# Patient Record
Sex: FEMALE
[De-identification: names, ages, dates, MRNs, and addresses within clinical notes are randomized per-mention and may not be internally consistent; named-entity substitution may affect disease eponyms.]

---

## 2020-07-16 PROBLEM — Z00.00 ENCOUNTER FOR PREVENTIVE HEALTH EXAMINATION: Status: ACTIVE | Noted: 2020-07-16

## 2020-07-20 DIAGNOSIS — Z86.39 PERSONAL HISTORY OF OTHER ENDOCRINE, NUTRITIONAL AND METABOLIC DISEASE: ICD-10-CM

## 2020-07-20 DIAGNOSIS — Z82.49 FAMILY HISTORY OF ISCHEMIC HEART DISEASE AND OTHER DISEASES OF THE CIRCULATORY SYSTEM: ICD-10-CM

## 2020-07-20 DIAGNOSIS — Z72.89 OTHER PROBLEMS RELATED TO LIFESTYLE: ICD-10-CM

## 2020-07-20 DIAGNOSIS — I83.93 ASYMPTOMATIC VARICOSE VEINS OF BILATERAL LOWER EXTREMITIES: ICD-10-CM

## 2020-07-21 ENCOUNTER — APPOINTMENT (OUTPATIENT)
Dept: VASCULAR SURGERY | Facility: CLINIC | Age: 35
End: 2020-07-21
Payer: COMMERCIAL

## 2020-07-21 VITALS
SYSTOLIC BLOOD PRESSURE: 128 MMHG | HEART RATE: 96 BPM | WEIGHT: 173 LBS | DIASTOLIC BLOOD PRESSURE: 88 MMHG | HEIGHT: 68 IN | OXYGEN SATURATION: 98 % | TEMPERATURE: 97.1 F | BODY MASS INDEX: 26.22 KG/M2

## 2020-07-21 PROCEDURE — 99203 OFFICE O/P NEW LOW 30 MIN: CPT | Mod: 25

## 2020-07-21 PROCEDURE — 93970 EXTREMITY STUDY: CPT

## 2020-07-21 NOTE — REASON FOR VISIT
[Initial Evaluation] : an initial evaluation [FreeTextEntry1] : pt here for b/l leg evaluation for severe b/l CVI with large branch varicose veins, dilated throbbing tender torturous; sx of swelling heat heaviness fatigue cramps edema tenderness itching and progressively worsening sx that interfere with life style. No sig PMH or PSH other than left knee ACL and meniscus surgery. Has worn support hose in the past with no relief. B/L vle today shows b/l gsv severe reflux and left aasv reflux. Pt will need b/l gsv evlt and b/l stab phlebectomy. Possible left aasv evlt.

## 2020-07-21 NOTE — DATA REVIEWED
[FreeTextEntry1] : \par RVT Penalo performed b/l VLE:\par \par Today’s bilateral leg ultrasound results confirm no DVT and no SVT bilateral leg.\par + deep reflux right femoral \par + severe superficial reflux  right and left  gsv,  left AASV\par  \par \par

## 2020-07-21 NOTE — PHYSICAL EXAM
[2+] : right 2+ [Ankle Swelling (On Exam)] : present [Ankle Swelling On The Left] : moderate [Varicose Veins Of Lower Extremities] : bilaterally [Ankle Swelling Bilaterally] : severe [] : present [Ankle Swelling On The Right] : mild [No Rash or Lesion] : No rash or lesion [Oriented to Person] : oriented to person [Alert] : alert [Oriented to Place] : oriented to place [Oriented to Time] : oriented to time [Calm] : calm [JVD] : no jugular venous distention  [Purpura] : no purpura  [Petechiae] : no petechiae [Skin Ulcer] : no ulcer [Skin Induration] : no induration [de-identified] : wdwn nad [de-identified] : wnl [de-identified] : aminatal [FreeTextEntry1] : large b/l leg branch varicose veins especially rt calf and left thigh - pictures taken [de-identified] : wnl [de-identified] : wnl

## 2020-07-21 NOTE — PROCEDURE
[FreeTextEntry1] : Varicose vein of bilateral lower extremity with inflammation (I83.11,I83.12);  Varicose veins with complication (I83.893); Venous/reflux insufficiency(I87.2); Leg pain (right, left) (M79.604, M79.605); Swelling of limb (M79.89); Cramps of limb (R25.2); Chronic venous hypertension with other complication (I87.399)\par \par Endovenous Laser Ablation (EVLT)(44871)\par \par Ms. CRISTINO LAUGHLIN with persistent and worsening severe bilateral leg Stage [] venous insufficiency which is (responsive/unresponsive)[] to a trial of support stockings 20-30 mmHg, elevation, exercise and pain medication.  Patient has complaints of worsening right leg CVI symptoms of pain with swelling, fatigue, heaviness, throbbing, edema, cramping, restlessness, and painful varicosities.  The patient’s pain interferes with their ability to exercise and work.  \par Patient has worn 20-30 mmHg support hose daily with (definite, no definite)[]  improvement in reflux symptoms.  This indicates that the patient will benefit from definitive therapy with endovenous laser ablation.  Patient has intact pulses in both legs with no evidence of arterial insufficiency.  \par Treatment is indicated not for cosmetic reasons but for symptomatic venous reflux disease with symptoms which are (responsive/not responsive)[]  to conservative therapy with high-grade medical support hose.  \par On venous ultrasound Duplex study, there is clear evidence of severe bilateral leg [] vein reflux with branch varicosities.  The size for the (GSV, LSV)[]  vein that needs EVLT is enlarged and measures up to [] cm .   Duration of reflux is measured at > []  seconds in the legs.  Arterial exam of the bilateral legs reveals intact pulses throughout.  \par The need for definitive effective treatment is based solely on severe, interfering and worsening reflux symptoms with evidence of severe bilateral (GSV, LSV)[] reflux with branch varicosities on ultrasound, (responsive/unresponsive)[] to high-pressure conservative stocking treatment.  \par \par Discussed with the patient that in laser vein treatment, a thin laser fiber is inserted into the vein through a very small entry point in the leg, and the laser light that emits through this fiber will seal the faulty vein.  This part of the procedure takes just a few minutes and requires only a local anesthetic.   You may feel an unfamiliar sensation, it is not painful.  You are encouraged to walk immediately after the procedure, and you can resume normal activity the same day.  No heavy lifting or strenuous activities x 7 days.  After treatment, there may be some minor soreness and bruising and will resolve with time.  Any discomfort can be treated effectively with over-the-counter, non-aspirin pain relievers as necessary.  Patient must return in 1 week for follow up duplex ultrasound.\par \par

## 2020-08-06 ENCOUNTER — APPOINTMENT (OUTPATIENT)
Dept: VASCULAR SURGERY | Facility: CLINIC | Age: 35
End: 2020-08-06
Payer: COMMERCIAL

## 2020-08-06 VITALS
DIASTOLIC BLOOD PRESSURE: 80 MMHG | OXYGEN SATURATION: 94 % | HEART RATE: 95 BPM | TEMPERATURE: 96.8 F | SYSTOLIC BLOOD PRESSURE: 118 MMHG

## 2020-08-06 PROCEDURE — 36478 ENDOVENOUS LASER 1ST VEIN: CPT | Mod: RT

## 2020-08-06 NOTE — REASON FOR VISIT
[Procedure: _________] : a [unfilled] procedure visit [FreeTextEntry1] : Patient has severe reflux right gsv and here for right gsv EVLT procedure.  CRISTINO LAUGHLIN proceeded with right gsv EVLT with no complications.  CRISTINO LAUGHLIN will follow up in one week for routine visit with VLE.\par

## 2020-08-06 NOTE — PROCEDURE
[FreeTextEntry1] : left gsv evlt [FreeTextEntry3] : Ms. CRISTINO LAUGHLIN is a 35 year year old F with a history of left lower extremity varicose veins previously seen in the office.  Ultrasound examination demonstrated reflux in the left gsv vein dumping into the patient’s varicosities in the left leg.  A trial of compression stockings, exercise, elevation, and pain medication was attempted without relief and as such, this patient was offered endovenous laser ablation therapy.  After explaining risks and benefits, informed consent was obtained and the patient agreed to proceed.  \par \par The patient was escorted to the procedure room and placed in the supine position on the examination table.  Laser safety glasses were placed on the patient.  The left leg was prepped and draped in the usual sterile fashion and time-out was called.  A sonographic probe was placed into a sterile sheath and the left lower extremity was imaged.  The left greater saphenous vein was identified under sonographic guidance and 1 mL of 1% lidocaine was administered subcutaneously using a 25G needle.  Using standard Seldinger technique, access to the left greater saphenous vein with the needle and corresponding guidewire was obtained.  The 4Fr Trevor-Sheath introducer was advanced over the wire to the treatment location.  The position of the sheath tip is 2 cm below the Sapheno-Femoral Junction and verified using ultrasound guidance.  Dilator and guidewire removed.   NeverTouch laser fiber inserted into the sheath until the Fiber Stop Assembly came in contact with the end of the Sheath Hub.  Fiber stop assembly removed and pulled the sheath back and locked to the Sheath-Elder fitting.  Fiber location confirmed using ultrasound guidance.\par \par Local tumescent anesthesia under ultrasound guidance was administered to ensure delivery of just enough anesthesia, approximately 250 mL, to achieve thermal protection.\par Anesthesia:  Tumescent anesthesia.  Tumescent anesthesia:  250 mL 0.9% Sodium Chloride for injection poured into sterile blue basin and added 83 mL sterile injectable lidocaine 1% (without Epinephrine) using 20 mL syringe.\par \par Laser was set to continuous mode and adjusted to desired power.  Laser placed in Enable mode.  The laser was activated by depressing the foot pedal while withdrawing the fiber and sheath together at a rate of 1 cm per 5 seconds.  The operating of the laser was ceased by removing the foot from foot pedal when the fiber end was 2 - 3 cm from the access site as indicated by markers on the distal tip of the Trevor-Sheath Introducer.   The sheath and laser fiber was removed and manual pressure applied at access site for 5 minutes.  Post procedure, the vein was imaged and showed successful closure of the left greater saphenous vein.  Dry dressing applied to access site, compression stocking 20 - 30 mm Hg applied to treated extremity.  The patient tolerated the procedure well with no complications.  Vital signs stable, patient was monitored throughout procedure.  The patient was escorted to the changing room.  \par \par Refer to procedure sheet for procedure start and end time, and peterson, length, energy and time documentation. \par \par Post-Op Instructions:  The following instructions were reviewed with the patient and a print out of the instructions provided to patient at time of visit:   1)  Compression stocking to be worn 24 hours per day x 7 days.  2)  Do not get the stocking wet for the first 3 days.  3)  Ambulation immediately following procedure and as much as possible for the first 7 days.  4)  Contact the office if any questions or concerns. 5)  Patient must follow-up within the first week for post procedure reflux study performed in office.  Reviewed with the patient the follow up visit is to ensure that there are no complications such as a deep vein thrombosis (DVT).  Patient acknowledged understanding of post-op instructions and was provided with print out of instructions at time of visit.  \par \par Reviewed with the patient post-procedure endovenous laser ablation (EVLT) instructions, provided patient with printed copy of instructions along with Dr. Inman’s cell phone number:  [phone], and advised patient to call for any questions or concerns prior to follow up visit.  All questions answered.\par \par

## 2020-08-11 ENCOUNTER — APPOINTMENT (OUTPATIENT)
Dept: VASCULAR SURGERY | Facility: CLINIC | Age: 35
End: 2020-08-11
Payer: COMMERCIAL

## 2020-08-11 VITALS
TEMPERATURE: 97.7 F | OXYGEN SATURATION: 96 % | SYSTOLIC BLOOD PRESSURE: 121 MMHG | DIASTOLIC BLOOD PRESSURE: 80 MMHG | HEART RATE: 83 BPM

## 2020-08-11 PROCEDURE — 99214 OFFICE O/P EST MOD 30 MIN: CPT | Mod: 25

## 2020-08-11 PROCEDURE — 93971 EXTREMITY STUDY: CPT

## 2020-08-11 NOTE — REASON FOR VISIT
[Follow-Up: _____] : a [unfilled] follow-up visit [FreeTextEntry1] : Pt here for routine followup s/p recent rt gsv evlt - no complaints, feels great - scheduled for left gsv evlt 8/25/2020

## 2020-08-11 NOTE — DATA REVIEWED
[FreeTextEntry1] : rvt tko performed rt gsv vle - rt gsv closedno dvt no svt no HIIT no truncal reflux rt sf junction compressible

## 2020-08-11 NOTE — PHYSICAL EXAM
[JVD] : no jugular venous distention  [Ankle Swelling (On Exam)] : present [2+] : left 2+ [Ankle Swelling On The Left] : moderate [Varicose Veins Of Lower Extremities] : present [Ankle Swelling Bilaterally] : severe [] : bilaterally [Ankle Swelling On The Right] : mild [No Rash or Lesion] : No rash or lesion [Purpura] : no purpura  [Petechiae] : no petechiae [Skin Ulcer] : no ulcer [Skin Induration] : no induration [Alert] : alert [Oriented to Person] : oriented to person [Oriented to Place] : oriented to place [Oriented to Time] : oriented to time [Calm] : calm [de-identified] : wdwn nad [de-identified] : wnl [de-identified] : aminatal [FreeTextEntry1] : large b/l leg branch varicose veins especially rt calf - normal post evlt right medial thigh mild bruising [de-identified] : wnl [de-identified] : wnl

## 2020-08-25 ENCOUNTER — APPOINTMENT (OUTPATIENT)
Dept: VASCULAR SURGERY | Facility: CLINIC | Age: 35
End: 2020-08-25
Payer: COMMERCIAL

## 2020-08-25 VITALS
TEMPERATURE: 97.3 F | SYSTOLIC BLOOD PRESSURE: 141 MMHG | DIASTOLIC BLOOD PRESSURE: 78 MMHG | HEART RATE: 81 BPM | OXYGEN SATURATION: 88 %

## 2020-08-25 PROCEDURE — 36478 ENDOVENOUS LASER 1ST VEIN: CPT | Mod: LT

## 2020-08-25 NOTE — REASON FOR VISIT
[Procedure: _________] : a [unfilled] procedure visit [FreeTextEntry1] : Ms. CRISTINO LAUGHLIN is a 35 year year old F with a history of left lower extremity varicose veins previously seen in the office.  Ultrasound examination demonstrated reflux in the left gsv vein dumping into the patient’s varicosities in the left leg.  A trial of compression stockings, exercise, elevation, and pain medication was attempted without relief and as such, this patient was offered endovenous laser ablation therapy.  After explaining risks and benefits, informed consent was obtained and the patient agreed to proceed.  \par \par The patient was escorted to the procedure room and placed in the supine position on the examination table.  Laser safety glasses were placed on the patient.  The left leg was prepped and draped in the usual sterile fashion and time-out was called.  A sonographic probe was placed into a sterile sheath and the left lower extremity was imaged.  The left greater saphenous vein was identified under sonographic guidance and 1 mL of 1% lidocaine was administered subcutaneously using a 25G needle.  Using standard Seldinger technique, access to the left greater saphenous vein with the needle and corresponding guidewire was obtained.  The 4Fr Trevor-Sheath introducer was advanced over the wire to the treatment location.  The position of the sheath tip is 2 cm below the Sapheno-Femoral Junction and verified using ultrasound guidance.  Dilator and guidewire removed.   NeverTouch laser fiber inserted into the sheath until the Fiber Stop Assembly came in contact with the end of the Sheath Hub.  Fiber stop assembly removed and pulled the sheath back and locked to the Sheath-Elder fitting.  Fiber location confirmed using ultrasound guidance.\par \par Local tumescent anesthesia under ultrasound guidance was administered to ensure delivery of just enough anesthesia, approximately 250 mL, to achieve thermal protection.\par Anesthesia:  Tumescent anesthesia.  Tumescent anesthesia:  250 mL 0.9% Sodium Chloride for injection poured into sterile blue basin and added 83 mL sterile injectable lidocaine 1% (without Epinephrine) using 20 mL syringe.\par \par Laser was set to continuous mode and adjusted to desired power.  Laser placed in Enable mode.  The laser was activated by depressing the foot pedal while withdrawing the fiber and sheath together at a rate of 1 cm per 5 seconds.  The operating of the laser was ceased by removing the foot from foot pedal when the fiber end was 2 - 3 cm from the access site as indicated by markers on the distal tip of the Trevor-Sheath Introducer.   The sheath and laser fiber was removed and manual pressure applied at access site for 5 minutes.  Post procedure, the vein was imaged and showed successful closure of the left greater saphenous vein.  Dry dressing applied to access site, compression stocking 20 - 30 mm Hg applied to treated extremity.  The patient tolerated the procedure well with no complications.  Vital signs stable, patient was monitored throughout procedure.  The patient was escorted to the changing room.  \par \par Refer to procedure sheet for procedure start and end time, and peterson, length, energy and time documentation. \par \par Post-Op Instructions:  The following instructions were reviewed with the patient and a print out of the instructions provided to patient at time of visit:   1)  Compression stocking to be worn 24 hours per day x 7 days.  2)  Do not get the stocking wet for the first 3 days.  3)  Ambulation immediately following procedure and as much as possible for the first 7 days.  4)  Contact the office if any questions or concerns. 5)  Patient must follow-up within the first week for post procedure reflux study performed in office.  Reviewed with the patient the follow up visit is to ensure that there are no complications such as a deep vein thrombosis (DVT).  Patient acknowledged understanding of post-op instructions and was provided with print out of instructions at time of visit.  \par \par Patient has severe reflux left gsv and here for left gsv EVLT procedure.  CRISTINO LAUGHLIN proceeded with left gsv EVLT with no complications.  CRISTINO LAUGHLIN will follow up in one week for routine visit with VLE.\par

## 2020-08-25 NOTE — PROCEDURE
[FreeTextEntry3] : Ms. CRISTINO LAUGHLIN is a 35 year year old F with a history of left lower extremity varicose veins previously seen in the office.  Ultrasound examination demonstrated reflux in the left gsv vein dumping into the patient’s varicosities in the left leg.  A trial of compression stockings, exercise, elevation, and pain medication was attempted without relief and as such, this patient was offered endovenous laser ablation therapy.  After explaining risks and benefits, informed consent was obtained and the patient agreed to proceed.  \par \par The patient was escorted to the procedure room and placed in the supine position on the examination table.  Laser safety glasses were placed on the patient.  The left leg was prepped and draped in the usual sterile fashion and time-out was called.  A sonographic probe was placed into a sterile sheath and the left lower extremity was imaged.  The left greater saphenous vein was identified under sonographic guidance and 1 mL of 1% lidocaine was administered subcutaneously using a 25G needle.  Using standard Seldinger technique, access to the left greater saphenous vein with the needle and corresponding guidewire was obtained.  The 4Fr Trevor-Sheath introducer was advanced over the wire to the treatment location.  The position of the sheath tip is 2 cm below the Sapheno-Femoral Junction and verified using ultrasound guidance.  Dilator and guidewire removed.   NeverTouch laser fiber inserted into the sheath until the Fiber Stop Assembly came in contact with the end of the Sheath Hub.  Fiber stop assembly removed and pulled the sheath back and locked to the Sheath-Elder fitting.  Fiber location confirmed using ultrasound guidance.\par \par Local tumescent anesthesia under ultrasound guidance was administered to ensure delivery of just enough anesthesia, approximately 250 mL, to achieve thermal protection.\par Anesthesia:  Tumescent anesthesia.  Tumescent anesthesia:  250 mL 0.9% Sodium Chloride for injection poured into sterile blue basin and added 83 mL sterile injectable lidocaine 1% (without Epinephrine) using 20 mL syringe.\par \par Laser was set to continuous mode and adjusted to desired power.  Laser placed in Enable mode.  The laser was activated by depressing the foot pedal while withdrawing the fiber and sheath together at a rate of 1 cm per 5 seconds.  The operating of the laser was ceased by removing the foot from foot pedal when the fiber end was 2 - 3 cm from the access site as indicated by markers on the distal tip of the Trevor-Sheath Introducer.   The sheath and laser fiber was removed and manual pressure applied at access site for 5 minutes.  Post procedure, the vein was imaged and showed successful closure of the left greater saphenous vein.  Dry dressing applied to access site, compression stocking 20 - 30 mm Hg applied to treated extremity.  The patient tolerated the procedure well with no complications.  Vital signs stable, patient was monitored throughout procedure.  The patient was escorted to the changing room.  \par \par Refer to procedure sheet for procedure start and end time, and peterson, length, energy and time documentation. \par \par Post-Op Instructions:  The following instructions were reviewed with the patient and a print out of the instructions provided to patient at time of visit:   1)  Compression stocking to be worn 24 hours per day x 7 days.  2)  Do not get the stocking wet for the first 3 days.  3)  Ambulation immediately following procedure and as much as possible for the first 7 days.  4)  Contact the office if any questions or concerns. 5)  Patient must follow-up within the first week for post procedure reflux study performed in office.  Reviewed with the patient the follow up visit is to ensure that there are no complications such as a deep vein thrombosis (DVT).  Patient acknowledged understanding of post-op instructions and was provided with print out of instructions at time of visit.  \par \par Reviewed with the patient post-procedure endovenous laser ablation (EVLT) instructions, provided patient with printed copy of instructions along with Dr. Inman’s cell phone number:  [phone], and advised patient to call for any questions or concerns prior to follow up visit.  All questions answered.\par \par

## 2020-09-02 ENCOUNTER — NON-APPOINTMENT (OUTPATIENT)
Age: 35
End: 2020-09-02

## 2020-09-08 ENCOUNTER — APPOINTMENT (OUTPATIENT)
Dept: VASCULAR SURGERY | Facility: CLINIC | Age: 35
End: 2020-09-08
Payer: COMMERCIAL

## 2020-09-08 VITALS
SYSTOLIC BLOOD PRESSURE: 109 MMHG | HEART RATE: 75 BPM | OXYGEN SATURATION: 97 % | TEMPERATURE: 98 F | DIASTOLIC BLOOD PRESSURE: 77 MMHG

## 2020-09-08 DIAGNOSIS — M79.89 PAIN IN LEFT LOWER LEG: ICD-10-CM

## 2020-09-08 DIAGNOSIS — M79.662 PAIN IN LEFT LOWER LEG: ICD-10-CM

## 2020-09-08 PROCEDURE — 99214 OFFICE O/P EST MOD 30 MIN: CPT | Mod: 25

## 2020-09-08 PROCEDURE — 93971 EXTREMITY STUDY: CPT

## 2020-09-08 NOTE — DATA REVIEWED
[FreeTextEntry1] : RVT Penalo performed left gsv vle s/p evlt - vein closed no dvt no reflux sf junction compressible no HIIT no truncal reflux

## 2020-09-08 NOTE — HISTORY OF PRESENT ILLNESS
[FreeTextEntry1] : Excellent results now s/p left gsv evlt and rt gsv evlt with significant improvement and much smaller branch varicose veins - pt will follopwup again in one month to see if she will require stab phlebectomy or not

## 2020-09-08 NOTE — PHYSICAL EXAM
[JVD] : no jugular venous distention  [2+] : left 2+ [Ankle Swelling (On Exam)] : present [Ankle Swelling On The Left] : moderate [Varicose Veins Of Lower Extremities] : present [Ankle Swelling Bilaterally] : severe [Ankle Swelling On The Right] : mild [] : bilaterally [No Rash or Lesion] : No rash or lesion [Purpura] : no purpura  [Petechiae] : no petechiae [Skin Ulcer] : no ulcer [Skin Induration] : no induration [Alert] : alert [Oriented to Person] : oriented to person [Oriented to Place] : oriented to place [Calm] : calm [Oriented to Time] : oriented to time [de-identified] : wdwn nad [de-identified] : wnl [de-identified] : aminatal [de-identified] : wnl [FreeTextEntry1] : significantly diminished size b/l leg branch varicose veins  [de-identified] : wnl

## 2020-10-06 ENCOUNTER — APPOINTMENT (OUTPATIENT)
Dept: VASCULAR SURGERY | Facility: CLINIC | Age: 35
End: 2020-10-06

## 2021-04-22 ENCOUNTER — APPOINTMENT (OUTPATIENT)
Dept: VASCULAR SURGERY | Facility: CLINIC | Age: 36
End: 2021-04-22

## 2021-05-06 ENCOUNTER — APPOINTMENT (OUTPATIENT)
Dept: VASCULAR SURGERY | Facility: CLINIC | Age: 36
End: 2021-05-06
Payer: COMMERCIAL

## 2021-05-06 VITALS
DIASTOLIC BLOOD PRESSURE: 86 MMHG | OXYGEN SATURATION: 98 % | HEART RATE: 102 BPM | SYSTOLIC BLOOD PRESSURE: 122 MMHG | TEMPERATURE: 98.1 F

## 2021-05-06 DIAGNOSIS — I83.893 VARICOSE VEINS OF BILATERAL LOWER EXTREMITIES WITH OTHER COMPLICATIONS: ICD-10-CM

## 2021-05-06 DIAGNOSIS — I83.10 VARICOSE VEINS OF UNSPECIFIED LOWER EXTREMITY WITH INFLAMMATION: ICD-10-CM

## 2021-05-06 DIAGNOSIS — I83.813 VARICOSE VEINS OF BILATERAL LOWER EXTREMITIES WITH PAIN: ICD-10-CM

## 2021-05-06 PROCEDURE — 99072 ADDL SUPL MATRL&STAF TM PHE: CPT

## 2021-05-06 PROCEDURE — 93970 EXTREMITY STUDY: CPT

## 2021-05-06 PROCEDURE — 99214 OFFICE O/P EST MOD 30 MIN: CPT | Mod: 25

## 2021-05-06 NOTE — PROCEDURE
[FreeTextEntry1] : pt to return for rt gsv branch and left aasv evlt - will likely also require rt stab phlebectomy\par \par Vascular surgeon discussed with the patient:\par Varicose veins are enlarged, twisted veins. Varicose veins are caused by increased blood pressure in the veins.  The blood moves towards the heart by 1-way valves in the veins. When the valves become weakened or damaged, blood can collect in the veins. This causes the veins to become enlarged. Sitting or standing for long periods can cause blood to pool in the leg veins, increasing the pressure within the veins. The veins can stretch from the increased pressure. This may weaken the walls of the veins and damage the valves.\par Varicose veins may be more common in some families (inherited).  Factors that may increase pressure include:  obesity, older age, being female, pregnancy, taking oral contraceptive pills or hormone replacement, being inactive, and/or smoking. \par The most common symptoms of varicose veins are sensations in the legs, such as a heavy feeling, burning, and/or aching. However, each individual may experience symptoms differently.  Other symptoms may include:  color changes in the skin, sores on the legs, or rash.  Severe varicose veins may eventually produce long-term mild swelling that can result in more serious skin and tissue problems, such as ulcers and non-healing sores.\par Varicose veins are diagnosed by a complete medical history, physical examination, and diagnostic studies for varicose veins including duplex ultrasound and color-flow imaging.  \par Medical treatment for varicose veins include:  compression stockings, sclerotherapy, endovenous laser ablation and/or surgical treatment microphlebectomy.  \par \par \par

## 2021-05-06 NOTE — HISTORY OF PRESENT ILLNESS
[FreeTextEntry1] : B/L vle in the office today with RVT shows rt gsv branch and left aasv with severe reflux; rt gsv closed c/w prior evlt; no dvt no svt

## 2021-05-06 NOTE — PHYSICAL EXAM
[JVD] : no jugular venous distention  [2+] : left 2+ [Ankle Swelling (On Exam)] : present [Ankle Swelling On The Left] : moderate [Varicose Veins Of Lower Extremities] : bilaterally [Ankle Swelling Bilaterally] : severe [] : bilaterally [Ankle Swelling On The Right] : mild [No Rash or Lesion] : No rash or lesion [Purpura] : no purpura  [Petechiae] : no petechiae [Skin Ulcer] : no ulcer [Skin Induration] : no induration [Alert] : alert [Oriented to Person] : oriented to person [Oriented to Place] : oriented to place [Oriented to Time] : oriented to time [Calm] : calm [de-identified] : wdwn nad [de-identified] : wnl [de-identified] : aminatal [FreeTextEntry1] : large right medial thigh and knee varicose vein [de-identified] : wnl [de-identified] : wnl

## 2021-05-06 NOTE — REASON FOR VISIT
[Follow-Up: _____] : a [unfilled] follow-up visit [FreeTextEntry1] : Pt returns now with c/o worsening sx of CVI both legs with ache cramps swellinf fatigue heaviness and enlarged dilated rt medial leg varicose vein.

## 2021-05-20 ENCOUNTER — APPOINTMENT (OUTPATIENT)
Dept: VASCULAR SURGERY | Facility: CLINIC | Age: 36
End: 2021-05-20
Payer: COMMERCIAL

## 2021-05-20 VITALS
HEART RATE: 81 BPM | OXYGEN SATURATION: 97 % | SYSTOLIC BLOOD PRESSURE: 132 MMHG | TEMPERATURE: 97.6 F | DIASTOLIC BLOOD PRESSURE: 87 MMHG

## 2021-05-20 VITALS
TEMPERATURE: 97.6 F | OXYGEN SATURATION: 98 % | DIASTOLIC BLOOD PRESSURE: 87 MMHG | SYSTOLIC BLOOD PRESSURE: 132 MMHG | HEART RATE: 85 BPM

## 2021-05-20 PROCEDURE — 36478 ENDOVENOUS LASER 1ST VEIN: CPT | Mod: RT

## 2021-05-20 NOTE — PROCEDURE
[FreeTextEntry3] : Right gsv endovenous laser ablation. \par \par Right Lower extremity varicose veins with inflammation, leg pain, leg swelling, and leg cramping.  Venous insufficiency, chronic venous hypertension and venous reflux.\par \par Ms. CRISTINO LAUGHLIN is a 36 year year old F with a history of right lower extremity varicose veins previously seen in the office.  Ultrasound examination demonstrated reflux in the right gsv vein dumping into the patient’s varicosities in the right leg.  A trial of compression stockings, exercise, elevation, and pain medication was attempted without relief and as such, this patient was offered endovenous laser ablation therapy.  After explaining risks and benefits, informed consent was obtained and the patient agreed to proceed.  \par \par The patient was escorted to the procedure room and placed in the supine position on the examination table.  Laser safety glasses were placed on the patient.  The right leg was prepped and draped in the usual sterile fashion and time-out was called.  A sonographic probe was placed into a sterile sheath and the right lower extremity was imaged.  The right greater saphenous vein was identified under sonographic guidance and 1 mL of 1% lidocaine was administered subcutaneously using a 25G needle.  Using standard Seldinger technique, access to the right greater saphenous vein with the needle and corresponding guidewire was obtained.  The 4Fr Trevor-Sheath introducer was advanced over the wire to the treatment location.  The position of the sheath tip is 2 cm below the Sapheno-Femoral Junction and verified using ultrasound guidance.  Dilator and guidewire removed.   NeverTouch laser fiber inserted into the sheath until the Fiber Stop Assembly came in contact with the end of the Sheath Hub.  Fiber stop assembly removed and pulled the sheath back and locked to the Sheath-Elder fitting.  Fiber location confirmed using ultrasound guidance.\par \par Local tumescent anesthesia under ultrasound guidance was administered to ensure delivery of just enough anesthesia, approximately 250 mL, to achieve thermal protection.\par Anesthesia:  Tumescent anesthesia.  Tumescent anesthesia:  250 mL 0.9% Sodium Chloride for injection poured into sterile blue basin and added 83 mL sterile injectable lidocaine 1% (without Epinephrine) using 20 mL syringe.\par \par Laser was set to continuous mode and adjusted to desired power.  Laser placed in Enable mode.  The laser was activated by depressing the foot pedal while withdrawing the fiber and sheath together at a rate of 1 cm per 5 seconds.  The operating of the laser was ceased by removing the foot from foot pedal when the fiber end was 2 - 3 cm from the access site as indicated by markers on the distal tip of the Trevor-Sheath Introducer.   The sheath and laser fiber was removed and manual pressure applied at access site for 5 minutes.  Post procedure, the vein was imaged and showed successful closure of the right greater saphenous vein.  Dry dressing applied to access site, compression stocking 20 - 30 mm Hg applied to treated extremity.  The patient tolerated the procedure well with no complications.  Vital signs stable, patient was monitored throughout procedure.  The patient was escorted to the changing room.  \par \par Refer to procedure sheet for procedure start and end time, and peterson, length, energy and time documentation. \par \par Post-Op Instructions:  The following instructions were reviewed with the patient and a print out of the instructions provided to patient at time of visit:   1)  Compression stocking to be worn 24 hours per day x 7 days.  2)  Do not get the stocking wet for the first 3 days.  3)  Ambulation immediately following procedure and as much as possible for the first 7 days.  4)  Contact the office if any questions or concerns. 5)  Patient must follow-up within the first week for post procedure reflux study performed in office.  Reviewed with the patient the follow up visit is to ensure that there are no complications such as a deep vein thrombosis (DVT).  Patient acknowledged understanding of post-op instructions and was provided with print out of instructions at time of visit.  \par \par Reviewed with the patient post-procedure endovenous laser ablation (EVLT) instructions, provided patient with printed copy of instructions along with Dr. Inman’s cell phone number:  [phone], and advised patient to call for any questions or concerns prior to follow up visit.  All questions answered. \par \par

## 2021-05-27 ENCOUNTER — APPOINTMENT (OUTPATIENT)
Dept: VASCULAR SURGERY | Facility: CLINIC | Age: 36
End: 2021-05-27
Payer: COMMERCIAL

## 2021-05-27 VITALS
HEART RATE: 78 BPM | SYSTOLIC BLOOD PRESSURE: 129 MMHG | OXYGEN SATURATION: 98 % | TEMPERATURE: 97.1 F | DIASTOLIC BLOOD PRESSURE: 74 MMHG

## 2021-05-27 PROCEDURE — 99213 OFFICE O/P EST LOW 20 MIN: CPT | Mod: 25

## 2021-05-27 PROCEDURE — 93971 EXTREMITY STUDY: CPT

## 2021-05-27 NOTE — REASON FOR VISIT
[Follow-Up: _____] : a [unfilled] follow-up visit [FreeTextEntry1] : She is s/p right gsv EVLT.  Patient is here today for routine one week follow up visit with ultrasound.  Right leg ultrasound confirms no dvt, no HIIT, no truncal reflux and right gsv is closed as desired. Pt scheduled for left AASV evlt 6/03.\par

## 2021-05-27 NOTE — PHYSICAL EXAM
[JVD] : no jugular venous distention  [2+] : left 2+ [Ankle Swelling (On Exam)] : present [Ankle Swelling On The Left] : moderate [Varicose Veins Of Lower Extremities] : bilaterally [Ankle Swelling Bilaterally] : severe [] : bilaterally [Ankle Swelling On The Right] : mild [No Rash or Lesion] : No rash or lesion [Purpura] : no purpura  [Petechiae] : no petechiae [Skin Ulcer] : no ulcer [Skin Induration] : no induration [Alert] : alert [Oriented to Person] : oriented to person [Oriented to Place] : oriented to place [Oriented to Time] : oriented to time [Calm] : calm [de-identified] : wdwn nad [de-identified] : wnl [de-identified] : aminatal [FreeTextEntry1] :  right medial thigh and knee varicose vein smaller s/p recent rt gsv evlt - mild post evlt brusing no rmal s/p procedure [de-identified] : wnl [de-identified] : wnl

## 2021-06-03 ENCOUNTER — APPOINTMENT (OUTPATIENT)
Dept: VASCULAR SURGERY | Facility: CLINIC | Age: 36
End: 2021-06-03
Payer: COMMERCIAL

## 2021-06-03 VITALS
HEART RATE: 87 BPM | DIASTOLIC BLOOD PRESSURE: 85 MMHG | SYSTOLIC BLOOD PRESSURE: 124 MMHG | OXYGEN SATURATION: 97 % | TEMPERATURE: 98.1 F

## 2021-06-03 PROCEDURE — 36478 ENDOVENOUS LASER 1ST VEIN: CPT | Mod: LT

## 2021-06-03 NOTE — REASON FOR VISIT
[Procedure: _________] : a [unfilled] procedure visit [FreeTextEntry1] : Patient has severe reflux left aasv and here for left aasv EVLT procedure.  CRISTINO LAUGHLIN proceeded with left aasv EVLT with no complications.  CRISTINO LAUGHLIN will follow up in one week for routine visit with VLE.\par \par

## 2021-06-03 NOTE — PROCEDURE
[FreeTextEntry3] : Left aasv endovenous laser ablation. \par \par Left Lower extremity varicose veins with inflammation, leg pain, leg swelling, and leg cramping.  Venous insufficiency, chronic venous hypertension and venous reflux.\par \par Ms. CRISTINO LAUGHLIN is a 36 year year old F with a history of left lower extremity varicose veins previously seen in the office.  Ultrasound examination demonstrated reflux in the left aasv vein dumping into the patient’s varicosities in the left leg.  A trial of compression stockings, exercise, elevation, and pain medication was attempted without relief and as such, this patient was offered endovenous laser ablation therapy.  After explaining risks and benefits, informed consent was obtained and the patient agreed to proceed.  \par \par The patient was escorted to the procedure room and placed in the supine position on the examination table.  Laser safety glasses were placed on the patient.  The left leg was prepped and draped in the usual sterile fashion and time-out was called.  A sonographic probe was placed into a sterile sheath and the left lower extremity was imaged.  The left anterior accessory saphenous vein was identified under sonographic guidance and 1 mL of 1% lidocaine was administered subcutaneously using a 25G needle.  Using standard Seldinger technique, access to the right anterior accessory saphenous vein with the needle and corresponding guidewire was obtained.  The 4Fr Trevor-Sheath introducer was advanced over the wire to the treatment location.  The position of the sheath tip is 2 cm below the Sapheno-Femoral Junction and verified using ultrasound guidance.  Dilator and guidewire removed.   NeverTouch laser fiber inserted into the sheath until the Fiber Stop Assembly came in contact with the end of the Sheath Hub.  Fiber stop assembly removed and pulled the sheath back and locked to the Sheath-Elder fitting.  Fiber location confirmed using ultrasound guidance.\par \par Local tumescent anesthesia under ultrasound guidance was administered to ensure delivery of just enough anesthesia, approximately 250 mL, to achieve thermal protection.\par Anesthesia:  Tumescent anesthesia.  Tumescent anesthesia:  250 mL 0.9% Sodium Chloride for injection poured into sterile blue basin and added 83 mL sterile injectable lidocaine 1% (without Epinephrine) using 20 mL syringe.\par \par Laser was set to continuous mode and adjusted to desired power.  Laser placed in Enable mode.  The laser was activated by depressing the foot pedal while withdrawing the fiber and sheath together at a rate of 1 cm per 5 seconds.  The operating of the laser was ceased by removing the foot from foot pedal when the fiber end was 2 - 3 cm from the access site as indicated by markers on the distal tip of the Trevor-Sheath Introducer.   The sheath and laser fiber was removed and manual pressure applied at access site for 5 minutes.  Post procedure, the vein was imaged and showed successful closure of the left anterior accessory saphenous vein.  Dry dressing applied to access site, compression stocking 20 - 30 mm Hg applied to treated extremity.  The patient tolerated the procedure well with no complications.  Vital signs stable, patient was monitored throughout procedure.  The patient was escorted to the changing room.  \par \par Refer to procedure sheet for procedure start and end time, and peterson, length, energy and time documentation. \par \par Post-Op Instructions:  The following instructions were reviewed with the patient and a print out of the instructions provided to patient at time of visit:   1)  Compression stocking to be worn 24 hours per day x 7 days.  2)  Do not get the stocking wet for the first 3 days.  3)  Ambulation immediately following procedure and as much as possible for the first 7 days.  4)  Contact the office if any questions or concerns. 5)  Patient must follow-up within the first week for post procedure reflux study performed in office.  Reviewed with the patient the follow up visit is to ensure that there are no complications such as a deep vein thrombosis (DVT).  Patient acknowledged understanding of post-op instructions and was provided with print out of instructions at time of visit.  \par \par Reviewed with the patient post-procedure endovenous laser ablation (EVLT) instructions, provided patient with printed copy of instructions along with Dr. Inman’s cell phone number:  [phone], and advised patient to call for any questions or concerns prior to follow up visit.  All questions answered.\par \par \par

## 2021-06-10 ENCOUNTER — APPOINTMENT (OUTPATIENT)
Dept: VASCULAR SURGERY | Facility: CLINIC | Age: 36
End: 2021-06-10
Payer: COMMERCIAL

## 2021-06-10 VITALS
TEMPERATURE: 97.5 F | DIASTOLIC BLOOD PRESSURE: 79 MMHG | SYSTOLIC BLOOD PRESSURE: 113 MMHG | OXYGEN SATURATION: 97 % | HEART RATE: 97 BPM

## 2021-06-10 PROCEDURE — 93971 EXTREMITY STUDY: CPT

## 2021-06-10 PROCEDURE — 99213 OFFICE O/P EST LOW 20 MIN: CPT | Mod: 25

## 2021-06-10 NOTE — HISTORY OF PRESENT ILLNESS
[FreeTextEntry1] : She is s/p left aasv EVLT.  Patient is here today for routine one week follow up visit with ultrasound.  Left leg ultrasound confirms no dvt, no HIIT, no truncal reflux and left aasv is closed as desired.\par \par Pt to return for left leg stab phlebectomy and right leg stab phlebectomy.\par \par \par \par

## 2021-06-10 NOTE — ASSESSMENT
[FreeTextEntry1] : pt to schedule b/l leg stab phlebectomy to remove enlarged branch varicose veins [Other: _____] : [unfilled]

## 2021-06-10 NOTE — PHYSICAL EXAM
[JVD] : no jugular venous distention  [2+] : left 2+ [Ankle Swelling (On Exam)] : present [Ankle Swelling On The Left] : moderate [Varicose Veins Of Lower Extremities] : bilaterally [Ankle Swelling Bilaterally] : severe [] : bilaterally [Ankle Swelling On The Right] : mild [No Rash or Lesion] : No rash or lesion [Purpura] : no purpura  [Petechiae] : no petechiae [Skin Ulcer] : no ulcer [Skin Induration] : no induration [Alert] : alert [Oriented to Person] : oriented to person [Oriented to Place] : oriented to place [Oriented to Time] : oriented to time [Calm] : calm [de-identified] : wdwn nad [de-identified] : wnl [de-identified] : aminatal [FreeTextEntry1] :  b/l leg dilated branch veins s/p b/l evlt [de-identified] : wnl [de-identified] : wnl

## 2021-06-10 NOTE — DATA REVIEWED
[FreeTextEntry1] : rvt performed left aasv vle s/p left aasv evlt - vein closed no dvt no eHIIT no reflux sf junction compressible

## 2021-06-23 ENCOUNTER — APPOINTMENT (OUTPATIENT)
Dept: VASCULAR SURGERY | Facility: CLINIC | Age: 36
End: 2021-06-23
Payer: COMMERCIAL

## 2021-06-23 VITALS
HEART RATE: 80 BPM | OXYGEN SATURATION: 98 % | TEMPERATURE: 97 F | DIASTOLIC BLOOD PRESSURE: 74 MMHG | SYSTOLIC BLOOD PRESSURE: 108 MMHG

## 2021-06-23 DIAGNOSIS — I83.812 VARICOSE VEINS OF LEFT LOWER EXTREMITY WITH PAIN: ICD-10-CM

## 2021-06-23 DIAGNOSIS — I83.12 VARICOSE VEINS OF LEFT LOWER EXTREMITY WITH INFLAMMATION: ICD-10-CM

## 2021-06-23 DIAGNOSIS — R25.2 CRAMP AND SPASM: ICD-10-CM

## 2021-06-23 DIAGNOSIS — I83.892 VARICOSE VEINS OF LEFT LOWER EXTREMITY WITH OTHER COMPLICATIONS: ICD-10-CM

## 2021-06-23 PROCEDURE — 37765 STAB PHLEB VEINS XTR 10-20: CPT | Mod: 52,LT

## 2021-06-23 NOTE — REASON FOR VISIT
[Procedure: _________] : a [unfilled] procedure visit [FreeTextEntry1] : Patient with painful dilated branch varicose veins left leg.  She is here for left leg microphlebectomy.  CRISTINO LAUGHLIN proceeded with left leg microphlebectomy without complications.  She will follow up in 2 weeks for routine visit and suture removal.\par

## 2021-06-23 NOTE — PROCEDURE
[FreeTextEntry3] : Left lower extremity varicose veins with pain, swelling, and venous insufficiency.\par  \par Varicose veins of the left lower extremity with inflammation.  Venous reflux/insufficiency, leg pain, limb swelling and cramps.  Chronic venous hypertension.\par \par Ms. CRISTINO LAUGHLIN is a 36 year year old F with a history of left lower extremity varicose veins previously seen in the office.  Ultrasound examination demonstrated multiple varicosities in the patient’s left leg with venous reflux.  A trial of compression stockings, exercise, elevation, and pain medication was attempted without relief and as such, this patient was offered microphlebectomy therapy.  After explaining risks and benefits, informed consent was obtained and the patient agreed to proceed.  \par \par The patient was escorted to the procedure room.  The varicose veins for treatment were marked out and the patient agreed with the markings.  Patient was placed in the  prone   position on the examination table.  The left leg was prepped and draped in the usual sterile fashion and time-out was called.  \par \par Local anesthesia was administered subcutaneously along the marked varicosities for treatment.  Anesthesia:  Local anesthesia 1% lidocaine without epinephrine.  Anesthesia is drawn up using 10 mL sterile syringe and 18G blunt fill needle, 10 mL sterile injectable lidocaine 1% (without Epinephrine) is drawn up.  18G blunt fill needle with 5 micron filter is removed and 25G 11/2 inch needle placed on syringe with medication for injection.  \par \par Total dose of lidocaine without epinephrine: 8\par \par  A total of  4  micro incisions were made over the varicosities that were previously marked out and the veins were grasped using a combination of mosquitoes and forceps.  The veins were removed.  At completion, hemostasis was ensured with digital pressure at the stab sites.  Once complete, the incisions were appropriately closed with 4-0 Monocryl suture and steri-strips.  The left leg was appropriately dressed with 4 inch Kerlix and 4 inch latex-free Ace bandage.  The patient tolerated the procedure well with no complications.  Vital signs stable, patient was monitored throughout.  Patient was escorted to the changing room.  Specimen sent to pathology for evaluation.\par \par Post-Op Instructions:  The following instructions were reviewed with the patient and a hard copy of the instructions provided:   1)  Surgical dressing to remain on for 72 hours.   2)  Do not get the surgical dressing wet for the first 3 days.  3)  Ambulation immediately following procedure and as much as possible.  4)  Contact the office if any questions or concerns; follow-up 2 weeks post-op for evaluation and suture removal.  Patient acknowledged understanding of post-op instructions and was provided with print out of instructions at time of visit.  Provided patient with Dr. Inman’s cell number:  [phone].  All questions answered.  \par \par

## 2021-06-29 LAB — CORE LAB BIOPSY: NORMAL

## 2021-07-14 ENCOUNTER — APPOINTMENT (OUTPATIENT)
Dept: VASCULAR SURGERY | Facility: CLINIC | Age: 36
End: 2021-07-14
Payer: COMMERCIAL

## 2021-07-14 VITALS
SYSTOLIC BLOOD PRESSURE: 116 MMHG | HEART RATE: 90 BPM | OXYGEN SATURATION: 97 % | DIASTOLIC BLOOD PRESSURE: 78 MMHG | TEMPERATURE: 97.6 F

## 2021-07-14 DIAGNOSIS — I83.11 VARICOSE VEINS OF RIGHT LOWER EXTREMITY WITH INFLAMMATION: ICD-10-CM

## 2021-07-14 DIAGNOSIS — M79.661 PAIN IN RIGHT LOWER LEG: ICD-10-CM

## 2021-07-14 DIAGNOSIS — I83.12 VARICOSE VEINS OF RIGHT LOWER EXTREMITY WITH INFLAMMATION: ICD-10-CM

## 2021-07-14 DIAGNOSIS — M79.89 PAIN IN RIGHT LOWER LEG: ICD-10-CM

## 2021-07-14 PROCEDURE — 37765 STAB PHLEB VEINS XTR 10-20: CPT | Mod: RT

## 2021-07-14 NOTE — VITALS
[Dull] : dull [Burning] : burning [Aching] : aching [Throbbing] : throbbing [FreeTextEntry3] : right lower leg enlarged branch varicose veins

## 2021-07-14 NOTE — PROCEDURE
[FreeTextEntry3] : Right lower extremity varicose veins with pain, swelling, and venous insufficiency.\par  \par Varicose veins of the right lower extremity with inflammation.  Venous reflux/insufficiency, leg pain, limb swelling and cramps.  Chronic venous hypertension.\par \par Ms. CRISTINO LAUGHLIN is a 36 year year old F with a history of right lower extremity varicose veins previously seen in the office.  Ultrasound examination demonstrated multiple varicosities in the patient’s right leg with venous reflux.  A trial of compression stockings, exercise, elevation, and pain medication was attempted without relief and as such, this patient was offered microphlebectomy therapy.  After explaining risks and benefits, informed consent was obtained and the patient agreed to proceed.  \par \par The patient was escorted to the procedure room.  The varicose veins for treatment were marked out and the patient agreed with the markings.  Patient was placed in the   []   position on the examination table.  The right leg was prepped and draped in the usual sterile fashion and time-out was called.  \par \par Local anesthesia was administered subcutaneously along the marked varicosities for treatment.  Anesthesia:  1% lidocaine without epinephrine.  18G blunt fill needle with 5 micron filter is removed and 25G 11/2 inch needle placed on syringe with medication for injection.  \par \par Total dose of lidocaine without epinephrine: 15 cc\par \par  A total of 10 micro incisions were made over the varicosities that were previously marked out and the veins were grasped using a combination of mosquitoes and forceps.  The veins were removed.  At completion, hemostasis was ensured with digital pressure at the stab sites.  Once complete, the incisions were appropriately closed with 4-0 Monocryl suture and steri-strips.  The right leg was appropriately dressed with 4 inch Kerlix and 4 inch latex-free Ace bandage.  The patient tolerated the procedure well with no complications.  Vital signs stable, patient was monitored throughout.  Patient was escorted to the changing room.  Specimen sent to pathology for evaluation.\par \par Post-Op Instructions:  The following instructions were reviewed with the patient and a hard copy of the instructions provided:   1)  Surgical dressing to remain on for 72 hours.   2)  Do not get the surgical dressing wet for the first 3 days.  3)  Ambulation immediately following procedure and as much as possible.  4)  Contact the office if any questions or concerns; follow-up 2 weeks post-op for evaluation and suture removal.  Patient acknowledged understanding of post-op instructions and was provided with print out of instructions at time of visit.  Provided patient with Dr. Inman’s cell number:  [phone].  All questions answered.\par \par

## 2021-07-14 NOTE — REASON FOR VISIT
[Procedure: _________] : a [unfilled] procedure visit [FreeTextEntry1] : Pt is here more than 10 days s/p prior stab phlebectomy. She is here now for rt lower leg stab phlebectomy to remove enlarged painful, throbbing, tender varicose veins. Patient with painful dilated branch varicose veins right leg.  She is here for right leg microphlebectomy.  CRISTINO TRUMAN proceeded with right leg microphlebectomy without complications.  She will follow up in 2 weeks for routine visit and suture removal.\par

## 2021-07-19 LAB — CORE LAB BIOPSY: NORMAL

## 2021-08-02 ENCOUNTER — APPOINTMENT (OUTPATIENT)
Dept: VASCULAR SURGERY | Facility: CLINIC | Age: 36
End: 2021-08-02
Payer: COMMERCIAL

## 2021-08-02 VITALS
TEMPERATURE: 97.2 F | HEART RATE: 90 BPM | SYSTOLIC BLOOD PRESSURE: 131 MMHG | DIASTOLIC BLOOD PRESSURE: 81 MMHG | OXYGEN SATURATION: 99 %

## 2021-08-02 DIAGNOSIS — I83.811 VARICOSE VEINS OF RIGHT LOWER EXTREMITY WITH PAIN: ICD-10-CM

## 2021-08-02 DIAGNOSIS — I87.2 VENOUS INSUFFICIENCY (CHRONIC) (PERIPHERAL): ICD-10-CM

## 2021-08-02 DIAGNOSIS — R25.2 CRAMP AND SPASM: ICD-10-CM

## 2021-08-02 DIAGNOSIS — I83.891 VARICOSE VEINS OF RIGHT LOWER EXTREMITY WITH OTHER COMPLICATIONS: ICD-10-CM

## 2021-08-02 PROCEDURE — 99213 OFFICE O/P EST LOW 20 MIN: CPT

## 2021-08-02 NOTE — PHYSICAL EXAM
[JVD] : no jugular venous distention  [2+] : left 2+ [Ankle Swelling (On Exam)] : not present [Ankle Swelling Bilaterally] : bilaterally  [Ankle Swelling On The Left] : moderate [Varicose Veins Of Lower Extremities] : bilaterally [] : bilaterally [Ankle Swelling On The Right] : mild [No Rash or Lesion] : No rash or lesion [Purpura] : no purpura  [Petechiae] : no petechiae [Skin Ulcer] : no ulcer [Skin Induration] : no induration [Alert] : alert [Oriented to Person] : oriented to person [Oriented to Place] : oriented to place [Oriented to Time] : oriented to time [Calm] : calm [de-identified] : wdwn nad [de-identified] : wnl [de-identified] : aminatal [FreeTextEntry1] : well healed incisions right leg s/p stab phlebectomy [de-identified] : wnl [de-identified] : wnl

## 2021-08-02 NOTE — REASON FOR VISIT
[Follow-Up: _____] : a [unfilled] follow-up visit [FreeTextEntry1] : Excellent healing on incisions with no residual cellulitis. Pt will continue to use liberal sunscreen over healing incisions.